# Patient Record
Sex: FEMALE | Race: WHITE | NOT HISPANIC OR LATINO | Employment: FULL TIME | URBAN - METROPOLITAN AREA
[De-identification: names, ages, dates, MRNs, and addresses within clinical notes are randomized per-mention and may not be internally consistent; named-entity substitution may affect disease eponyms.]

---

## 2017-01-03 ENCOUNTER — TELEPHONE (OUTPATIENT)
Dept: OBSTETRICS AND GYNECOLOGY | Facility: CLINIC | Age: 57
End: 2017-01-03

## 2017-01-03 NOTE — TELEPHONE ENCOUNTER
----- Message from Jr Peace MD sent at 1/2/2017  9:12 AM EST -----  Contact: [phone]  6 months sounds like a good start.    Jr Peace MD     ----- Message -----     From: Cecile Frazier MA     Sent: 12/29/2016   3:59 PM       To: MD Dr. Kobi Dennis,     When would you like patient to follow up with you?  She had her pap done 6/2016.  Biopsy done in July, 2016.     Thanks,  Cecile BLANDON  ----- Message -----     From: Yashira Perez     Sent: 12/29/2016   2:25 PM       To: Ceicle Frazier MA    This pt has had uterine cancer..  She wants to know if she needs to follow up in 6 month or 1 year?  She has been told by her Oncologist to check with her GYN to see how often she needs to follow up.    Please call her and let her know.  She also wants to know when was the last time she was here for a pap.    Thanks!    Yashira

## 2017-01-24 ENCOUNTER — OFFICE VISIT (OUTPATIENT)
Dept: OBSTETRICS AND GYNECOLOGY | Facility: CLINIC | Age: 57
End: 2017-01-24

## 2017-01-24 VITALS
DIASTOLIC BLOOD PRESSURE: 78 MMHG | BODY MASS INDEX: 50.02 KG/M2 | SYSTOLIC BLOOD PRESSURE: 130 MMHG | HEIGHT: 64 IN | WEIGHT: 293 LBS

## 2017-01-24 DIAGNOSIS — N76.2 ACUTE VULVITIS: Primary | ICD-10-CM

## 2017-01-24 DIAGNOSIS — Z13.9 SCREENING: ICD-10-CM

## 2017-01-24 LAB
BILIRUB BLD-MCNC: NEGATIVE MG/DL
CLARITY, POC: CLEAR
COLOR UR: YELLOW
GLUCOSE UR STRIP-MCNC: NEGATIVE MG/DL
KETONES UR QL: NEGATIVE
LEUKOCYTE EST, POC: ABNORMAL
NITRITE UR-MCNC: NEGATIVE MG/ML
PH UR: 6.5 [PH] (ref 5–8)
PROT UR STRIP-MCNC: NEGATIVE MG/DL
RBC # UR STRIP: NEGATIVE /UL
SP GR UR: 1.01 (ref 1–1.03)
UROBILINOGEN UR QL: NORMAL

## 2017-01-24 PROCEDURE — 81002 URINALYSIS NONAUTO W/O SCOPE: CPT | Performed by: OBSTETRICS & GYNECOLOGY

## 2017-01-24 PROCEDURE — 99213 OFFICE O/P EST LOW 20 MIN: CPT | Performed by: OBSTETRICS & GYNECOLOGY

## 2017-01-24 RX ORDER — LEVOTHYROXINE SODIUM 0.2 MG/1
200 TABLET ORAL DAILY
COMMUNITY
End: 2017-08-14 | Stop reason: SDUPTHER

## 2017-01-24 RX ORDER — NABUMETONE 500 MG/1
TABLET, FILM COATED ORAL
COMMUNITY
Start: 2016-09-08 | End: 2017-01-24

## 2017-01-24 RX ORDER — ERGOCALCIFEROL 1.25 MG/1
CAPSULE ORAL
COMMUNITY
Start: 2016-07-11 | End: 2017-08-14 | Stop reason: SDUPTHER

## 2017-01-24 RX ORDER — ATENOLOL 100 MG/1
TABLET ORAL
COMMUNITY
Start: 2016-08-01 | End: 2017-01-24 | Stop reason: SDUPTHER

## 2017-01-24 RX ORDER — LEVOTHYROXINE SODIUM 0.2 MG/1
200 TABLET ORAL
COMMUNITY
Start: 2016-01-25 | End: 2017-01-24

## 2017-01-24 RX ORDER — BUPROPION HYDROCHLORIDE 75 MG/1
75 TABLET ORAL
COMMUNITY
Start: 2016-11-03

## 2017-01-24 RX ORDER — LOSARTAN POTASSIUM AND HYDROCHLOROTHIAZIDE 12.5; 1 MG/1; MG/1
TABLET ORAL
COMMUNITY
Start: 2016-08-01 | End: 2017-01-24

## 2017-01-24 RX ORDER — LEVOTHYROXINE SODIUM 0.03 MG/1
25 TABLET ORAL
COMMUNITY
Start: 2016-02-15 | End: 2017-01-24 | Stop reason: SDUPTHER

## 2017-01-24 NOTE — MR AVS SNAPSHOT
Faith Magaña   1/24/2017 11:15 AM   Office Visit    Dept Phone:  [phone]   Encounter #:  [id number]    Provider:  Jr Peace MD   Department:  [facility]                Your Full Care Plan              Today's Medication Changes          These changes are accurate as of: 1/24/17 12:03 PM.  If you have any questions, ask your nurse or doctor.               New Medication(s)Ordered:     nystatin-triamcinolone 472767-2.1 UNIT/GM-% cream   Commonly known as:  MYCOLOG II   Apply  topically 2 (Two) Times a Day for 5 days.   Started by:  Jr Peace MD         Medication(s)that have changed:     aspirin 81 MG tablet   Take 81 mg by mouth.   What changed:  Another medication with the same name was removed. Continue taking this medication, and follow the directions you see here.   Changed by:  Jr Paece MD       atenolol 100 MG tablet   Commonly known as:  TENORMIN   Take 100 mg by mouth.   What changed:  Another medication with the same name was removed. Continue taking this medication, and follow the directions you see here.   Changed by:  Jr Peace MD       * LEVOTHROID 25 MCG tablet   Generic drug:  levothyroxine   Take 25 mcg by mouth.   What changed:  Another medication with the same name was removed. Continue taking this medication, and follow the directions you see here.   Changed by:  Jr Peace MD       * levothyroxine 200 MCG tablet   Commonly known as:  SYNTHROID, LEVOTHROID   Take 200 mcg by mouth Daily.   What changed:  Another medication with the same name was removed. Continue taking this medication, and follow the directions you see here.   Changed by:  Jr Peace MD       losartan-hydrochlorothiazide 100-12.5 MG per tablet   Commonly known as:  HYZAAR   Take 1 tablet by mouth.   What changed:  Another medication with the same name was removed. Continue taking this medication, and follow the directions you see here.      Changed by:  Jr Peace MD       nabumetone 500 MG tablet   Commonly known as:  RELAFEN   Take 500 mg by mouth.   What changed:  Another medication with the same name was removed. Continue taking this medication, and follow the directions you see here.   Changed by:  Jr Peace MD       TRINTELLIX 20 MG tablet   Generic drug:  Vortioxetine HBr   TAKE ONE TABLET BY MOUTH DAILY   What changed:  Another medication with the same name was removed. Continue taking this medication, and follow the directions you see here.   Changed by:  Jr Peace MD       * Notice:  This list has 2 medication(s) that are the same as other medications prescribed for you. Read the directions carefully, and ask your doctor or other care provider to review them with you.      Stop taking medication(s)listed here:     GAVILYTE-G 236 G solution   Generic drug:  polyethylene glycol   Stopped by:  Jr Peace MD                Where to Get Your Medications      These medications were sent to [address] [phone] [place] [id number] [address] [address]     Phone:  [phone]     nystatin-triamcinolone 399857-6.1 UNIT/GM-% cream                  Your Updated Medication List          This list is accurate as of: 1/24/17 12:03 PM.  Always use your most recent med list.                aspirin 81 MG tablet       atenolol 100 MG tablet   Commonly known as:  TENORMIN       buPROPion 75 MG tablet   Commonly known as:  WELLBUTRIN       * LEVOTHROID 25 MCG tablet   Generic drug:  levothyroxine       * levothyroxine 200 MCG tablet   Commonly known as:  SYNTHROID, LEVOTHROID       losartan-hydrochlorothiazide 100-12.5 MG per tablet   Commonly known as:  HYZAAR       nabumetone 500 MG tablet   Commonly known as:  RELAFEN       nystatin-triamcinolone 354268-2.1 UNIT/GM-% cream   Commonly known as:  MYCOLOG II   Apply  topically 2 (Two) Times a Day for 5 days.        Omega-3 Fish Oil 1200 MG capsule       ONE-A-DAY WOMENS FORMULA PO       TRINTELLIX 20 MG tablet   Generic drug:  Vortioxetine HBr       * vitamin D 86666 UNITS capsule capsule   Commonly known as:  ERGOCALCIFEROL       * vitamin D 71262 UNITS capsule capsule   Commonly known as:  ERGOCALCIFEROL       * Notice:  This list has 4 medication(s) that are the same as other medications prescribed for you. Read the directions carefully, and ask your doctor or other care provider to review them with you.            We Performed the Following     POC Urinalysis Dipstick       You Were Diagnosed With        Codes Comments    Acute vulvitis    -  Primary ICD-10-CM: N76.2  ICD-9-CM: 616.10     Screening     ICD-10-CM: Z13.9  ICD-9-CM: V82.9       Instructions     None    Patient Instructions History      Upcoming Appointments     Visit Type Date Time Department    GYN FOLLOW UP 2017 11:15 AM [facility]    GYN FOLLOW UP 2017  8:00 AM [facility]    GYN FOLLOW UP 7/3/2017  8:00 AM [facility]      [employer] Signup     [employer] allows you to send messages to your doctor, view your test results, renew your prescriptions, schedule appointments, and more. To sign up, go to [employer] and click on the Sign Up Now link in the New User? box. Enter your [employer] Activation Code exactly as it appears below along with the last four digits of your Social Security Number and your Date of Birth () to complete the sign-up process. If you do not sign up before the expiration date, you must request a new code.    [employer] Activation Code: [email or web address]  Expires: 2017 11:59 AM    If you have questions, you can email [email or web address] or call [phone] to talk to our [employer] staff. Remember, [employer] is NOT to be used for urgent needs. For medical emergencies, dial 911.               Other Info from Your Visit           Your Appointments     2017   "8:00 AM EST   GYN FOLLOW UP with Jr Peace MD   [facility] (--)    [address] [address] [phone]            Jul 03, 2017  8:00 AM EDT   GYN FOLLOW UP with Jr Peace MD   [facility] (--)    [address] [address] [phone]              Allergies     Sulfa Antibiotics  Shortness Of Breath, Rash      Reason for Visit     Follow-up VAGINAL RAWNESS      Vital Signs     Blood Pressure Height Weight Last Menstrual Period Body Mass Index Smoking Status    130/78 64\" (162.6 cm) 319 lb (145 kg) (LMP Unknown) 54.76 kg/m2 Never Smoker      Problems and Diagnoses Noted     High blood pressure    Endometrial cancer    Underactive thyroid    Sleep apnea    Inflamed vulva    -  Primary    Screening          Results     POC Urinalysis Dipstick      Component Value Standard Range & Units    Color Yellow Yellow, Straw, Dark Yellow, Ariana    Clarity, UA Clear Clear    Glucose, UA Negative Negative, 1000 mg/dL (3+) mg/dL    Bilirubin Negative Negative    Ketones, UA Negative Negative    Specific Gravity  1.015 1.005 - 1.030    Blood, UA Negative Negative    pH, Urine 6.5 5.0 - 8.0    Protein, POC Negative Negative mg/dL    Urobilinogen, UA Normal Normal    Leukocytes Trace Negative    Nitrite, UA Negative Negative                    "

## 2017-01-24 NOTE — PROGRESS NOTES
[facility]     2017      Patient:  Faith Magaña   MR#:[id number]    Office note    CC: vaginal irritation    Subjective     History of Present Illness  56 y.o. female  focal spot in the vaginal area with extreme irritation.  The symptoms started 2 weeks ago then in the last week seemed to get a lot better.  The area is markedly tender with burning exacerbated by urination    Other remarkable history is recent diagnosis of endometrial cancer status post hysterectomy last August.      Patient Active Problem List   Diagnosis   • Well female exam with routine gynecological exam   • Morbid obesity with BMI of 50.0-59.9, adult   • Postmenopausal bleeding   • Prothrombin [id number] mutation   • Endometrial cancer   • Benign hypertension   • Hypothyroidism   • Sleep apnea       Past Medical History   Diagnosis Date   • Anxiety    • Arthritis    • Asthma    • Depression    • Disease of thyroid gland      HYPO   • Endometriosis    • H/O prothrombin mutation 2015   • Hypertension    • LGSIL (low grade squamous intraepithelial dysplasia) 2004   • Lupus 2015   • Menopause    • Morbid obesity    • Pelvic pressure in female    • Prothrombin [id number] mutation 2015     heterozygous   • Sleep apnea    • Trichomonas infection 2014       Past Surgical History   Procedure Laterality Date   • Tubal abdominal ligation  1988     Sangeeta Tony       The following portions of the patient's history were reviewed and updated as appropriate: allergies, current medications, past family history, past medical history, past social history, past surgical history and problem list.    Review of Systems    BP Readings from Last 3 Encounters:   17 130/78   16 128/78   16 116/80      Wt Readings from Last 3 Encounters:   17 (!) 319 lb (145 kg)   16 (!) 308 lb (140 kg)   16 (!) 304 lb (138 kg)      BMI: Estimated body mass index is 54.76 kg/(m^2) as calculated from the  "following:    Height as of this encounter: 64\" (162.6 cm).    Weight as of this encounter: 319 lb (145 kg).  BSA: Estimated body surface area is 2.39 meters squared as calculated from the following:    Height as of this encounter: 64\" (162.6 cm).    Weight as of this encounter: 319 lb (145 kg).    Objective   Physical Exam   Constitutional: She is oriented to person, place, and time. She appears well-developed and well-nourished.   Cardiovascular: Normal rate and regular rhythm.    Pulmonary/Chest: Effort normal and breath sounds normal.   Abdominal: Soft. Bowel sounds are normal. She exhibits no distension and no mass. There is no tenderness.   Genitourinary: Vagina normal and uterus normal.   Genitourinary Comments: Some mild vulvar irritation and focal area of yeast on posterior vaginal fourchette   Neurological: She is alert and oriented to person, place, and time.   Psychiatric: She has a normal mood and affect. Her behavior is normal. Judgment and thought content normal.   Nursing note and vitals reviewed.        Assessment/Plan     1.  Yeast vulvitis -mytrex   2.  Endometrial cancer- records and staging are requested  3.  Morbid obesity      Jr Peace MD   1/24/2017 11:42 AM  "

## 2017-02-03 ENCOUNTER — TELEPHONE (OUTPATIENT)
Dept: OBSTETRICS AND GYNECOLOGY | Facility: CLINIC | Age: 57
End: 2017-02-03

## 2017-02-03 NOTE — TELEPHONE ENCOUNTER
----- Message from Helga Cohen sent at 2/3/2017  1:03 PM EST -----  Regarding: PROBLEM POST HYSTERECTOMY  Contact: [phone]  PT CALLED REPORTING THAT SHE HAS NOTICED BLOOD IN URINE AND PINKISH BLOOD WHEN WIPING.  STOMACH HAVING SLIGHT CRAMPS.  JUST WANTED TO BE REASSURED IF THIS IS SOMETHING SHE SHOULD BE CONCERNED ABOUT.

## 2017-02-03 NOTE — TELEPHONE ENCOUNTER
Spoke with Faith she will watch over weekend and if not resolved call Monday for apt per Dr Peace pt states she knows it not a uti on faint blood did not want to go to er

## 2017-02-03 NOTE — TELEPHONE ENCOUNTER
----- Message from Helga Cohen sent at 2/3/2017  1:03 PM EST -----  Regarding: PROBLEM POST HYSTERECTOMY  Contact: [phone]  PT CALLED REPORTING THAT SHE HAS NOTICED BLOOD IN URINE AND PINKISH BLOOD WHEN WIPING.  STOMACH HAVING SLIGHT CRAMPS.  JUST WANTED TO BE REASSURED IF THIS IS SOMETHING SHE SHOULD BE CONCERNED ABOUT.

## 2017-08-14 ENCOUNTER — OFFICE VISIT (OUTPATIENT)
Dept: OBSTETRICS AND GYNECOLOGY | Facility: CLINIC | Age: 57
End: 2017-08-14

## 2017-08-14 VITALS
DIASTOLIC BLOOD PRESSURE: 60 MMHG | BODY MASS INDEX: 48.82 KG/M2 | HEIGHT: 65 IN | SYSTOLIC BLOOD PRESSURE: 122 MMHG | WEIGHT: 293 LBS

## 2017-08-14 DIAGNOSIS — Z12.4 SCREENING FOR MALIGNANT NEOPLASM OF CERVIX: ICD-10-CM

## 2017-08-14 DIAGNOSIS — D68.52 HETEROZYGOUS FOR PROTHROMBIN G20210A MUTATION (HCC): ICD-10-CM

## 2017-08-14 DIAGNOSIS — C54.1 ENDOMETRIAL CANCER (HCC): ICD-10-CM

## 2017-08-14 DIAGNOSIS — Z13.9 SCREENING: ICD-10-CM

## 2017-08-14 DIAGNOSIS — E66.01 MORBID OBESITY WITH BMI OF 50.0-59.9, ADULT (HCC): ICD-10-CM

## 2017-08-14 DIAGNOSIS — G47.37 CENTRAL SLEEP APNEA DUE TO MEDICAL CONDITION: ICD-10-CM

## 2017-08-14 DIAGNOSIS — Z01.419 WELL FEMALE EXAM WITH ROUTINE GYNECOLOGICAL EXAM: Primary | ICD-10-CM

## 2017-08-14 DIAGNOSIS — I10 BENIGN HYPERTENSION: ICD-10-CM

## 2017-08-14 DIAGNOSIS — E03.9 ACQUIRED HYPOTHYROIDISM: ICD-10-CM

## 2017-08-14 LAB
BILIRUB BLD-MCNC: NEGATIVE MG/DL
CLARITY, POC: CLEAR
COLOR UR: YELLOW
GLUCOSE UR STRIP-MCNC: NEGATIVE MG/DL
KETONES UR QL: NEGATIVE
LEUKOCYTE EST, POC: NEGATIVE
NITRITE UR-MCNC: NEGATIVE MG/ML
PH UR: 5 [PH] (ref 5–8)
PROT UR STRIP-MCNC: NEGATIVE MG/DL
RBC # UR STRIP: NEGATIVE /UL
SP GR UR: 1.03 (ref 1–1.03)
UROBILINOGEN UR QL: NORMAL

## 2017-08-14 PROCEDURE — 99396 PREV VISIT EST AGE 40-64: CPT | Performed by: OBSTETRICS & GYNECOLOGY

## 2017-08-14 PROCEDURE — 81002 URINALYSIS NONAUTO W/O SCOPE: CPT | Performed by: OBSTETRICS & GYNECOLOGY

## 2017-08-14 RX ORDER — BUPROPION HYDROCHLORIDE 75 MG/1
TABLET ORAL
COMMUNITY
Start: 2017-04-24 | End: 2017-08-14 | Stop reason: SDUPTHER

## 2017-08-14 RX ORDER — NABUMETONE 500 MG/1
TABLET, FILM COATED ORAL
COMMUNITY
Start: 2016-09-08 | End: 2018-05-25

## 2017-08-14 RX ORDER — ERGOCALCIFEROL 1.25 MG/1
CAPSULE ORAL
COMMUNITY
Start: 2017-07-24 | End: 2017-08-14 | Stop reason: SDUPTHER

## 2017-08-14 RX ORDER — PSEUDOEPHEDRINE HCL 30 MG
100 TABLET ORAL
COMMUNITY
Start: 2016-08-20 | End: 2018-05-25

## 2017-08-14 RX ORDER — LEVOTHYROXINE SODIUM 0.2 MG/1
TABLET ORAL
COMMUNITY
Start: 2017-02-13 | End: 2017-08-14 | Stop reason: SDUPTHER

## 2017-08-14 RX ORDER — AMLODIPINE BESYLATE 5 MG/1
5 TABLET ORAL
COMMUNITY
Start: 2017-03-21 | End: 2018-03-21

## 2017-08-14 RX ORDER — FOLIC ACID/MULTIVIT,IRON,MINER 0.4MG-18MG
TABLET ORAL
COMMUNITY
End: 2017-08-14 | Stop reason: SDUPTHER

## 2017-08-14 RX ORDER — LOSARTAN POTASSIUM AND HYDROCHLOROTHIAZIDE 12.5; 1 MG/1; MG/1
TABLET ORAL
COMMUNITY
Start: 2017-06-25 | End: 2017-08-14 | Stop reason: SDUPTHER

## 2017-08-14 RX ORDER — ATENOLOL 100 MG/1
100 TABLET ORAL
COMMUNITY
Start: 2017-08-03

## 2017-08-14 RX ORDER — AMLODIPINE BESYLATE 5 MG/1
TABLET ORAL
COMMUNITY
Start: 2017-06-04 | End: 2017-08-14 | Stop reason: SDUPTHER

## 2017-08-14 RX ORDER — LOSARTAN POTASSIUM AND HYDROCHLOROTHIAZIDE 12.5; 1 MG/1; MG/1
TABLET ORAL
COMMUNITY
Start: 2017-07-01 | End: 2017-08-14 | Stop reason: SDUPTHER

## 2017-08-14 RX ORDER — ATENOLOL 50 MG/1
TABLET ORAL
COMMUNITY
Start: 2017-08-03 | End: 2017-08-14 | Stop reason: SDUPTHER

## 2017-08-14 RX ORDER — LEVOTHYROXINE SODIUM 0.03 MG/1
25 TABLET ORAL
COMMUNITY
Start: 2016-02-15 | End: 2017-08-14 | Stop reason: SDUPTHER

## 2017-08-14 NOTE — PROGRESS NOTES
[facility]  Routine Annual Visit    2017    Patient: Faith Magaña          MR#:[id number]      History of Present Illness    56 y.o. female  who presents for annual exam.    Patient presents feeling well without complaints.  Her history is remarkable for grade 1 stage I endometrial carcinoma.  The patient had a da Justin laparoscopic hysterectomy performed by Dr. Kennedy last year that occurred without complications.    No LMP recorded (lmp unknown). Patient is postmenopausal.  Obstetric History:  OB History      Para Term  AB TAB SAB Ectopic Multiple Living    4 3 3  1  1   3         Menstrual History:     No LMP recorded (lmp unknown). Patient is postmenopausal.       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   • Endometrial cancer   • Benign hypertension   • Hypothyroidism   • Sleep apnea       Past Medical History:   Diagnosis Date   • Anxiety    • Arthritis    • Asthma    • Depression    • Endometriosis    • Hypertension    • Hypothyroid     HYPO   • LGSIL (low grade squamous intraepithelial dysplasia) 2004   • Lupus 2015   • Menopause    • Morbid obesity    • Pelvic pressure in female    • Prothrombin [id number] mutation 2015    heterozygous   • Sleep apnea    • Trichomonas infection 2014       Past Surgical History:   Procedure Laterality Date   • HYSTERECTOMY  2016    ANAHY KENNEDY    • TUBAL ABDOMINAL LIGATION      Sangeeta Tony       History   Smoking Status   • Never Smoker   Smokeless Tobacco   • Never Used       Family History   Problem Relation Age of Onset   • Stroke Mother    • Heart disease Mother    • Hypertension Mother    • Diabetes Mother    • Heart disease Father    • Kidney disease Other    • Mental illness Other    •  Stroke Sister    • Mental illness Sister    • Ovarian cancer Sister 28   • Hypertension Maternal Aunt    • Diabetes Maternal Aunt    • Breast cancer Maternal Grandmother 62   • Bleeding Disorder Daughter        Prior to Admission medications    Medication Sig Start Date End Date Taking? Authorizing Provider   amLODIPine (NORVASC) 5 MG tablet Take 5 mg by mouth. 3/21/17 3/21/18 Yes Historical Provider, MD   aspirin 81 MG tablet Take 81 mg by mouth.   Yes Historical Provider, MD   atenolol (TENORMIN) 100 MG tablet Take 100 mg by mouth. 8/3/17  Yes Historical Provider, MD   buPROPion (WELLBUTRIN) 75 MG tablet Take 75 mg by mouth. 11/3/16  Yes Historical Provider, MD   docusate sodium 100 MG capsule Take 100 mg by mouth. 8/20/16  Yes Historical Provider, MD   losartan-hydrochlorothiazide (HYZAAR) 100-12.5 MG per tablet Take 1 tablet by mouth. 7/8/15  Yes Historical Provider, MD   Multiple Vitamins-Calcium (ONE-A-DAY WOMENS FORMULA PO) Take 1 tablet by mouth daily.   Yes Historical Provider, MD   nabumetone (RELAFEN) 500 MG tablet TAKE ONE TABLET BY MOUTH TWICE A DAY 9/8/16  Yes Historical Provider, MD   Omega-3 Fatty Acids (OMEGA-3 FISH OIL) 1200 MG capsule Take  by mouth.   Yes Historical Provider, MD   vitamin D (ERGOCALCIFEROL) 49628 UNITS capsule capsule TAKE ONE CAPSULE BY MOUTH ONCE WEEKLY 7/11/16  Yes Historical Provider, MD   buPROPion (WELLBUTRIN) 75 MG tablet TAKE ONE TABLET BY MOUTH TWICE A DAY 4/24/17 8/14/17 Yes Historical Provider, MD   levothyroxine (SYNTHROID, LEVOTHROID) 200 MCG tablet TAKE ONE TABLET BY MOUTH DAILY 2/13/17 8/14/17 Yes Historical Provider, MD   levothyroxine (SYNTHROID, LEVOTHROID) 25 MCG tablet Take 25 mcg by mouth. 2/15/16 8/14/17 Yes Historical Provider, MD   losartan-hydrochlorothiazide (HYZAAR) 100-12.5 MG per tablet TAKE ONE TABLET BY MOUTH DAILY 6/25/17 8/14/17 Yes Historical Provider, MD   losartan-hydrochlorothiazide (HYZAAR) 100-12.5 MG per tablet TAKE ONE TABLET BY MOUTH  "DAILY 7/1/17 8/14/17 Yes Historical Provider, MD   Vortioxetine HBr (TRINTELLIX) 20 MG tablet TAKE ONE TABLET BY MOUTH DAILY 8/1/17 8/14/17 Yes Historical Provider, MD   amLODIPine (NORVASC) 5 MG tablet  6/4/17 8/14/17  Historical Provider, MD   aspirin 81 MG tablet Take  by mouth.  8/14/17  Historical Provider, MD   atenolol (TENORMIN) 100 MG tablet Take 100 mg by mouth. 7/8/15 8/14/17  Historical Provider, MD   atenolol (TENORMIN) 50 MG tablet  8/3/17 8/14/17  Historical Provider, MD   levothyroxine (SYNTHROID, LEVOTHROID) 200 MCG tablet Take 200 mcg by mouth Daily.  8/14/17  Historical Provider, MD   nabumetone (RELAFEN) 500 MG tablet Take 500 mg by mouth. 7/8/15 8/14/17  Historical Provider, MD   Omega-3 Fatty Acids (OMEGA-3 FISH OIL) 1200 MG capsule Take  by mouth.  8/14/17  Historical Provider, MD   vitamin D (ERGOCALCIFEROL) 73299 UNITS capsule capsule TAKE ONE CAPSULE BY MOUTH ONCE WEEKLY 7/11/16 8/14/17  Historical Provider, MD   vitamin D (ERGOCALCIFEROL) 29297 UNITS capsule capsule TAKE ONE CAPSULE BY MOUTH ONCE WEEKLY 7/24/17 8/14/17  Historical Provider, MD   Vortioxetine HBr (TRINTELLIX) 20 MG tablet TAKE ONE TABLET BY MOUTH DAILY 11/7/16 8/14/17  Historical Provider, MD     ________________________________________    Current contraception: status post hysterectomy  History of abnormal Pap smear: no  Family history of uterine or ovarian cancer: no  Family History of colon cancer/colon polyps: no  History of abnormal mammogram: no  History of abnormal lipids: no    The following portions of the patient's history were reviewed and updated as appropriate: allergies, current medications, past family history, past medical history, past social history, past surgical history and problem list.    Review of Systems    Pertinent items are noted in HPI.     Objective   Physical Exam    /60  Ht 64.5\" (163.8 cm)  Wt (!) 318 lb 9.6 oz (145 kg)  LMP  (LMP Unknown)  BMI 53.84 kg/m2   BP Readings from Last 3 " "Encounters:   08/14/17 122/60   01/24/17 130/78   07/18/16 128/78      Wt Readings from Last 3 Encounters:   08/14/17 (!) 318 lb 9.6 oz (145 kg)   01/24/17 (!) 319 lb (145 kg)   07/18/16 (!) 308 lb (140 kg)        BMI: Estimated body mass index is 53.84 kg/(m^2) as calculated from the following:    Height as of this encounter: 64.5\" (163.8 cm).    Weight as of this encounter: 318 lb 9.6 oz (145 kg).    General:   alert, appears stated age and cooperative   Heart: regular rate and rhythm, S1, S2 normal, no murmur, click, rub or gallop   Lungs: clear to auscultation bilaterally   Abdomen: soft, non-tender, without masses or organomegaly and Morbidly obese   Breast: inspection negative, no nipple discharge or bleeding, no masses or nodularity palpable   Vulva: normal   Vagina: normal mucosa   Cervix: absent   Uterus: absent    Adnexa: exam limited by habitus     As part of wellness and prevention, the following topics were discussed with the patient:    []  Nutrition  [x]  Physical activity/regular exercise   []  Healthy weight  []  Injury prevention  []  Substance misuse/abuse  []  Sexual behavior  []  STD prevention  []  Contaception  []  Dental health  []  Mental health  []  Immunization  [x]  Encouraged SBE       Assessment:    Faith was seen today for gynecologic exam.    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      Jr Peace MD  8/14/2017 9:54 AM  "

## 2017-08-17 LAB
CYTOLOGIST CVX/VAG CYTO: NORMAL
CYTOLOGY CVX/VAG DOC THIN PREP: NORMAL
DX ICD CODE: NORMAL
HIV 1 & 2 AB SER-IMP: NORMAL
HPV I/H RISK 4 DNA CVX QL PROBE+SIG AMP: NEGATIVE
Lab: NORMAL
OTHER STN SPEC: NORMAL
PATH REPORT.FINAL DX SPEC: NORMAL
STAT OF ADQ CVX/VAG CYTO-IMP: NORMAL

## 2017-08-18 ENCOUNTER — TELEPHONE (OUTPATIENT)
Dept: OBSTETRICS AND GYNECOLOGY | Facility: CLINIC | Age: 57
End: 2017-08-18

## 2018-05-25 ENCOUNTER — HOSPITAL ENCOUNTER (EMERGENCY)
Facility: HOSPITAL | Age: 58
Discharge: HOME | End: 2018-05-25
Attending: EMERGENCY MEDICINE | Admitting: EMERGENCY MEDICINE

## 2018-05-25 ENCOUNTER — HOSPITAL ENCOUNTER (OUTPATIENT)
Dept: CARDIOLOGY | Facility: HOSPITAL | Age: 58
Discharge: HOME | End: 2018-05-25
Admitting: INTERNAL MEDICINE

## 2018-05-25 ENCOUNTER — APPOINTMENT (OUTPATIENT)
Dept: GENERAL RADIOLOGY | Facility: HOSPITAL | Age: 58
End: 2018-05-25

## 2018-05-25 ENCOUNTER — HOSPITAL ENCOUNTER (OUTPATIENT)
Dept: CARDIOLOGY | Facility: HOSPITAL | Age: 58
Discharge: HOME | End: 2018-05-25
Attending: INTERNAL MEDICINE

## 2018-05-25 VITALS
RESPIRATION RATE: 18 BRPM | TEMPERATURE: 97.5 F | DIASTOLIC BLOOD PRESSURE: 61 MMHG | HEART RATE: 50 BPM | BODY MASS INDEX: 50.02 KG/M2 | HEIGHT: 64 IN | OXYGEN SATURATION: 94 % | SYSTOLIC BLOOD PRESSURE: 123 MMHG | WEIGHT: 293 LBS

## 2018-05-25 VITALS
HEIGHT: 64 IN | WEIGHT: 293 LBS | OXYGEN SATURATION: 96 % | DIASTOLIC BLOOD PRESSURE: 68 MMHG | BODY MASS INDEX: 50.02 KG/M2 | SYSTOLIC BLOOD PRESSURE: 110 MMHG | RESPIRATION RATE: 16 BRPM

## 2018-05-25 DIAGNOSIS — R07.2 PRECORDIAL CHEST PAIN: Primary | ICD-10-CM

## 2018-05-25 DIAGNOSIS — R94.31 ABNORMAL EKG: ICD-10-CM

## 2018-05-25 DIAGNOSIS — R07.2 PRECORDIAL PAIN: Primary | ICD-10-CM

## 2018-05-25 DIAGNOSIS — R07.2 PRECORDIAL PAIN: ICD-10-CM

## 2018-05-25 LAB
ALBUMIN SERPL-MCNC: 4.1 G/DL (ref 3.5–5.2)
ALBUMIN/GLOB SERPL: 1.2 G/DL
ALP SERPL-CCNC: 69 U/L (ref 39–117)
ALT SERPL W P-5'-P-CCNC: 20 U/L (ref 1–33)
ANION GAP SERPL CALCULATED.3IONS-SCNC: 11.5 MMOL/L
AST SERPL-CCNC: 17 U/L (ref 1–32)
BASOPHILS # BLD AUTO: 0.02 10*3/MM3 (ref 0–0.2)
BASOPHILS NFR BLD AUTO: 0.3 % (ref 0–1.5)
BH CV NUCLEAR PRIOR STUDY: 2
BH CV STRESS BP STAGE 1: NORMAL
BH CV STRESS COMMENTS STAGE 1: NORMAL
BH CV STRESS DOSE REGADENOSON STAGE 1: 0.4
BH CV STRESS DURATION MIN STAGE 1: 0
BH CV STRESS DURATION SEC STAGE 1: 10
BH CV STRESS HR STAGE 1: 118
BH CV STRESS PROTOCOL 1: NORMAL
BH CV STRESS RECOVERY BP: NORMAL MMHG
BH CV STRESS RECOVERY HR: 77 BPM
BH CV STRESS STAGE 1: 1
BILIRUB SERPL-MCNC: 0.5 MG/DL (ref 0.1–1.2)
BUN BLD-MCNC: 16 MG/DL (ref 6–20)
BUN/CREAT SERPL: 17.4 (ref 7–25)
CALCIUM SPEC-SCNC: 9.6 MG/DL (ref 8.6–10.5)
CHLORIDE SERPL-SCNC: 101 MMOL/L (ref 98–107)
CO2 SERPL-SCNC: 26.5 MMOL/L (ref 22–29)
CREAT BLD-MCNC: 0.92 MG/DL (ref 0.57–1)
DEPRECATED RDW RBC AUTO: 44 FL (ref 37–54)
EOSINOPHIL # BLD AUTO: 0.2 10*3/MM3 (ref 0–0.7)
EOSINOPHIL NFR BLD AUTO: 3.4 % (ref 0.3–6.2)
ERYTHROCYTE [DISTWIDTH] IN BLOOD BY AUTOMATED COUNT: 14 % (ref 11.7–13)
GFR SERPL CREATININE-BSD FRML MDRD: 62 ML/MIN/1.73
GLOBULIN UR ELPH-MCNC: 3.3 GM/DL
GLUCOSE BLD-MCNC: 105 MG/DL (ref 65–99)
HCT VFR BLD AUTO: 38.6 % (ref 35.6–45.5)
HGB BLD-MCNC: 12.5 G/DL (ref 11.9–15.5)
IMM GRANULOCYTES # BLD: 0.02 10*3/MM3 (ref 0–0.03)
IMM GRANULOCYTES NFR BLD: 0.3 % (ref 0–0.5)
LV EF NUC BP: 58 %
LYMPHOCYTES # BLD AUTO: 1.9 10*3/MM3 (ref 0.9–4.8)
LYMPHOCYTES NFR BLD AUTO: 31.8 % (ref 19.6–45.3)
MAXIMAL PREDICTED HEART RATE: 161 BPM
MCH RBC QN AUTO: 28 PG (ref 26.9–32)
MCHC RBC AUTO-ENTMCNC: 32.4 G/DL (ref 32.4–36.3)
MCV RBC AUTO: 86.4 FL (ref 80.5–98.2)
MONOCYTES # BLD AUTO: 0.35 10*3/MM3 (ref 0.2–1.2)
MONOCYTES NFR BLD AUTO: 5.9 % (ref 5–12)
NEUTROPHILS # BLD AUTO: 3.5 10*3/MM3 (ref 1.9–8.1)
NEUTROPHILS NFR BLD AUTO: 58.6 % (ref 42.7–76)
PERCENT MAX PREDICTED HR: 73.29 %
PLATELET # BLD AUTO: 234 10*3/MM3 (ref 140–500)
PMV BLD AUTO: 11.2 FL (ref 6–12)
POTASSIUM BLD-SCNC: 3.8 MMOL/L (ref 3.5–5.2)
PROT SERPL-MCNC: 7.4 G/DL (ref 6–8.5)
RBC # BLD AUTO: 4.47 10*6/MM3 (ref 3.9–5.2)
SODIUM BLD-SCNC: 139 MMOL/L (ref 136–145)
STRESS BASELINE BP: NORMAL MMHG
STRESS BASELINE HR: 59 BPM
STRESS PERCENT HR: 86 %
STRESS POST PEAK BP: NORMAL MMHG
STRESS POST PEAK HR: 118 BPM
STRESS TARGET HR: 137 BPM
TROPONIN T SERPL-MCNC: <0.01 NG/ML (ref 0–0.03)
WBC NRBC COR # BLD: 5.97 10*3/MM3 (ref 4.5–10.7)

## 2018-05-25 PROCEDURE — 93016 CV STRESS TEST SUPVJ ONLY: CPT | Performed by: INTERNAL MEDICINE

## 2018-05-25 PROCEDURE — 78451 HT MUSCLE IMAGE SPECT SING: CPT

## 2018-05-25 PROCEDURE — 94760 N-INVAS EAR/PLS OXIMETRY 1: CPT

## 2018-05-25 PROCEDURE — 0 TECHNETIUM TETROFOSMIN KIT: Performed by: INTERNAL MEDICINE

## 2018-05-25 PROCEDURE — 99284 EMERGENCY DEPT VISIT MOD MDM: CPT

## 2018-05-25 PROCEDURE — 93017 CV STRESS TEST TRACING ONLY: CPT

## 2018-05-25 PROCEDURE — 93005 ELECTROCARDIOGRAM TRACING: CPT | Performed by: EMERGENCY MEDICINE

## 2018-05-25 PROCEDURE — 93010 ELECTROCARDIOGRAM REPORT: CPT | Performed by: INTERNAL MEDICINE

## 2018-05-25 PROCEDURE — 99204 OFFICE O/P NEW MOD 45 MIN: CPT | Performed by: INTERNAL MEDICINE

## 2018-05-25 PROCEDURE — 71045 X-RAY EXAM CHEST 1 VIEW: CPT

## 2018-05-25 PROCEDURE — A9502 TC99M TETROFOSMIN: HCPCS | Performed by: INTERNAL MEDICINE

## 2018-05-25 PROCEDURE — 78452 HT MUSCLE IMAGE SPECT MULT: CPT

## 2018-05-25 PROCEDURE — 93018 CV STRESS TEST I&R ONLY: CPT | Performed by: INTERNAL MEDICINE

## 2018-05-25 PROCEDURE — 78452 HT MUSCLE IMAGE SPECT MULT: CPT | Performed by: INTERNAL MEDICINE

## 2018-05-25 PROCEDURE — 25010000002 REGADENOSON 0.4 MG/5ML SOLUTION: Performed by: INTERNAL MEDICINE

## 2018-05-25 PROCEDURE — 84484 ASSAY OF TROPONIN QUANT: CPT | Performed by: NURSE PRACTITIONER

## 2018-05-25 PROCEDURE — 85025 COMPLETE CBC W/AUTO DIFF WBC: CPT | Performed by: NURSE PRACTITIONER

## 2018-05-25 PROCEDURE — 80053 COMPREHEN METABOLIC PANEL: CPT | Performed by: NURSE PRACTITIONER

## 2018-05-25 RX ORDER — LEVOTHYROXINE SODIUM 0.2 MG/1
TABLET ORAL
COMMUNITY
Start: 2017-11-20 | End: 2019-12-20

## 2018-05-25 RX ADMIN — TETROFOSMIN 1 DOSE: 1.38 INJECTION, POWDER, LYOPHILIZED, FOR SOLUTION INTRAVENOUS at 12:32

## 2018-05-25 RX ADMIN — REGADENOSON 0.4 MG: 0.08 INJECTION, SOLUTION INTRAVENOUS at 12:33

## 2018-05-25 RX ADMIN — TETROFOSMIN 1 DOSE: 1.38 INJECTION, POWDER, LYOPHILIZED, FOR SOLUTION INTRAVENOUS at 12:33

## 2018-05-25 NOTE — PROGRESS NOTES
Date of Office Visit: 2018  Encounter Provider: Anatoly Lester MD  Place of Service: [facility] CARDIOLOGY  Patient Name: Faith Magaña  :1960    Chief complaint: Chest pain    History of Present Illness:    This is a very pleasant 57 year-old white female with a past medical history significant   for obesity and hypertension who presents for evaluation.  The patient states that she   had tightness under her left breast which radiated into her left arm (and also felt numb).    She also had some vague pressure in the center of her chest.  She took 4 baby aspirin   at home, and then proceeded to the emergency department.  There, her EKG showed   T-wave changes in V1 and V2, and her troponin was negative.  She was then sent to   the [facility] in our office for further evaluation.  She is currently chest pain-free.  She has   not had any other exertional symptoms recently.  Her father had an MI at age 62, and   her mother had an MI at age 83.    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     • atenolol (TENORMIN) 100 MG tablet Take 100 mg by mouth.     • buPROPion (WELLBUTRIN) 75 MG tablet Take 75 mg by mouth.     • levothyroxine (SYNTHROID, LEVOTHROID) 200 MCG tablet TAKE ONE TABLET BY MOUTH DAILY     •  losartan-hydrochlorothiazide (HYZAAR) 100-12.5 MG per tablet Take 1 tablet by mouth.     • Multiple Vitamins-Calcium (ONE-A-DAY WOMENS FORMULA PO) Take 1 tablet by mouth daily.     • Omega-3 Fatty Acids (OMEGA-3 FISH OIL) 1200 MG capsule Take  by mouth.     • vitamin D (ERGOCALCIFEROL) 51162 UNITS capsule capsule TAKE ONE CAPSULE BY MOUTH ONCE WEEKLY     • Vortioxetine HBr 20 MG tablet Take 20 mg by mouth.     • [DISCONTINUED] docusate sodium 100 MG capsule Take 100 mg by mouth.     • [DISCONTINUED] nabumetone (RELAFEN) 500 MG tablet TAKE ONE TABLET BY MOUTH TWICE A DAY       No current facility-administered medications on file prior to encounter.      Allergies as of 05/25/2018 - Reviewed 05/25/2018   Allergen Reaction Noted   • Sulfa antibiotics Shortness Of Breath and Rash 06/27/2013     Social History     Social History   • Marital status:      Spouse name: N/A   • Number of children: 3   • Years of education: 12     Occupational History   • Not on file.     Social History Main Topics   • Smoking status: Never Smoker   • Smokeless tobacco: Never Used   • Alcohol use No   • Drug use: No   • Sexual activity: No     Other Topics Concern   • Not on file     Social History Narrative    Ob/gyn patient since 2004.     Family History   Problem Relation Age of Onset   • Stroke Mother    • Heart disease Mother    • Hypertension Mother    • Diabetes Mother    • Heart disease Father    • Kidney disease Other    • Mental illness Other    • Stroke Sister    • Mental illness Sister    • Ovarian cancer Sister 28   • Hypertension Maternal Aunt    • Diabetes Maternal Aunt    • Breast cancer Maternal Grandmother 62   • Bleeding Disorder Daughter        Review of Systems   Constitution: Positive for malaise/fatigue.   HENT: Positive for sore throat.    Cardiovascular: Positive for dyspnea on exertion.   Endocrine: Positive for heat intolerance.   Skin: Positive for itching and rash.   Musculoskeletal: Positive for joint  "pain, joint swelling and myalgias.   Neurological: Positive for excessive daytime sleepiness.   Psychiatric/Behavioral: Positive for depression. The patient is nervous/anxious.    All other systems reviewed and are negative.     Objective:     Vitals:    05/25/18 0924   BP: 110/68   BP Location: Left arm   Patient Position: Sitting   Resp: 16   SpO2: 96%   Weight: (!) 145 kg (320 lb)   Height: 162.6 cm (64\")     Body mass index is 54.93 kg/m².    Physical Exam   Constitutional: She is oriented to person, place, and time. She appears well-developed and well-nourished.   HENT:   Head: Normocephalic and atraumatic.   Eyes: Conjunctivae are normal.   Neck: Neck supple.   Cardiovascular: Normal rate and regular rhythm.  Exam reveals no gallop and no friction rub.    No murmur heard.  Pulmonary/Chest: Effort normal and breath sounds normal.   Abdominal: Soft. There is no tenderness.   Musculoskeletal: She exhibits no edema.   Neurological: She is alert and oriented to person, place, and time.   Skin: Skin is warm.   Psychiatric: She has a normal mood and affect. Her behavior is normal.     Lab Review:   Procedures      Assessment:       Diagnosis Plan   1. Precordial pain  Stress Test With Myocardial Perfusion One Day   2. Abnormal EKG  Stress Test With Myocardial Perfusion One Day     Plan:       Again, the patient has had a single episode of chest discomfort which occurred this morning.    She had tightness under her left breast with associated left arm numbness.  She also had   some vague pressure in the center of her chest.  Her EKG showed T-wave changes in V1   and V2, although her troponin remained negative.  She does have a family history of coronary   artery disease, and I have recommended proceeding with a Lexiscan Myoview stress test.    She does not feel that she will be able to exercise on a treadmill successfully.  Further plans   will be made pending the results of the stress test.      "

## 2018-06-18 ENCOUNTER — TELEPHONE (OUTPATIENT)
Dept: CARDIOLOGY | Facility: HOSPITAL | Age: 58
End: 2018-06-18

## 2018-12-04 ENCOUNTER — OFFICE VISIT (OUTPATIENT)
Dept: OBSTETRICS AND GYNECOLOGY | Age: 58
End: 2018-12-04

## 2018-12-04 VITALS
HEIGHT: 65 IN | WEIGHT: 293 LBS | DIASTOLIC BLOOD PRESSURE: 70 MMHG | BODY MASS INDEX: 48.82 KG/M2 | SYSTOLIC BLOOD PRESSURE: 132 MMHG

## 2018-12-04 DIAGNOSIS — E66.01 MORBID OBESITY WITH BMI OF 50.0-59.9, ADULT (HCC): ICD-10-CM

## 2018-12-04 DIAGNOSIS — I10 ESSENTIAL HYPERTENSION: ICD-10-CM

## 2018-12-04 DIAGNOSIS — Z13.89 SCREENING FOR BLOOD OR PROTEIN IN URINE: ICD-10-CM

## 2018-12-04 DIAGNOSIS — C54.1 ENDOMETRIAL CANCER (HCC): ICD-10-CM

## 2018-12-04 DIAGNOSIS — G47.37 CENTRAL SLEEP APNEA DUE TO MEDICAL CONDITION: ICD-10-CM

## 2018-12-04 DIAGNOSIS — D68.52 HETEROZYGOUS FOR PROTHROMBIN G20210A MUTATION (HCC): ICD-10-CM

## 2018-12-04 DIAGNOSIS — E03.9 ACQUIRED HYPOTHYROIDISM: ICD-10-CM

## 2018-12-04 DIAGNOSIS — Z01.419 WELL FEMALE EXAM WITH ROUTINE GYNECOLOGICAL EXAM: Primary | ICD-10-CM

## 2018-12-04 DIAGNOSIS — Z12.72 VAGINAL PAP SMEAR: ICD-10-CM

## 2018-12-04 LAB
BILIRUB BLD-MCNC: NEGATIVE MG/DL
CLARITY, POC: CLEAR
COLOR UR: YELLOW
GLUCOSE UR STRIP-MCNC: NEGATIVE MG/DL
KETONES UR QL: NEGATIVE
LEUKOCYTE EST, POC: NEGATIVE
NITRITE UR-MCNC: NEGATIVE MG/ML
PH UR: 5.5 [PH] (ref 5–8)
PROT UR STRIP-MCNC: NEGATIVE MG/DL
RBC # UR STRIP: NEGATIVE /UL
SP GR UR: 1.02 (ref 1–1.03)
UROBILINOGEN UR QL: NORMAL

## 2018-12-04 PROCEDURE — 99396 PREV VISIT EST AGE 40-64: CPT | Performed by: OBSTETRICS & GYNECOLOGY

## 2018-12-04 PROCEDURE — 81002 URINALYSIS NONAUTO W/O SCOPE: CPT | Performed by: OBSTETRICS & GYNECOLOGY

## 2018-12-04 RX ORDER — CYCLOSPORINE 0.5 MG/ML
1 EMULSION OPHTHALMIC AS NEEDED
COMMUNITY
Start: 2018-08-30 | End: 2019-12-20

## 2018-12-04 RX ORDER — CLOTRIMAZOLE AND BETAMETHASONE DIPROPIONATE 10; .64 MG/G; MG/G
CREAM TOPICAL 2 TIMES DAILY
Qty: 45 G | Refills: 1 | Status: SHIPPED | OUTPATIENT
Start: 2018-12-04 | End: 2019-12-20

## 2018-12-04 NOTE — PROGRESS NOTES
Routine Annual Visit    2018    Patient: Faith Magaña          MR#:[id number]      History of Present Illness    Chief Complaint   Patient presents with   • Gynecologic Exam     Annual.  Last pap 17, negative. Last mammo, will call for report. Cologard 3/2018, negative.        58 y.o. female  who presents for annual exam.    Patient presents for routine annual exam feeling well without any specific complaints.  The patient does report episodic issues of focal itching on her vulva        No LMP recorded (lmp unknown). Patient is postmenopausal.  Obstetric History:  OB History      Para Term  AB Living    4 3 3   1 3    SAB TAB Ectopic Molar Multiple Live Births    1         3         Menstrual History:     No LMP recorded (lmp unknown). Patient is postmenopausal.       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 (CMS/[facility])   • Endometrial cancer (CMS/HCC)   • Essential hypertension   • Acquired hypothyroidism   • Sleep apnea       Past Medical History:   Diagnosis Date   • Anxiety    • Arthritis    • Asthma    • Depression    • Endometriosis    • Hypertension    • Hypothyroid     HYPO   • LGSIL (low grade squamous intraepithelial dysplasia) 2004   • Lupus 2015   • Menopause    • Morbid obesity (CMS/HCC)    • Pelvic pressure in female    • Prothrombin [id number] mutation (CMS/HCC) 2015    heterozygous   • Sleep apnea    • Trichomonas infection 2014       Past Surgical History:   Procedure Laterality Date   • HYSTERECTOMY  2016    ANAHY LUJAN    • TUBAL ABDOMINAL LIGATION      Sangeeta Tony       Social History     Tobacco Use   Smoking Status Never Smoker   Smokeless Tobacco Never Used       Family History   Problem Relation Age of Onset   •  Stroke Mother    • Heart disease Mother    • Hypertension Mother    • Diabetes Mother    • Heart disease Father    • Kidney disease Other    • Mental illness Other    • Stroke Sister    • Mental illness Sister    • Ovarian cancer Sister 28   • Hypertension Maternal Aunt    • Diabetes Maternal Aunt    • Breast cancer Maternal Grandmother 62   • Bleeding Disorder Daughter        Prior to Admission medications    Medication Sig Start Date End Date Taking? Authorizing Provider   aspirin 81 MG tablet Take 81 mg by mouth.   Yes Miranda Hernandez MD   atenolol (TENORMIN) 100 MG tablet Take 100 mg by mouth. 8/3/17  Yes Miranda Hernandez MD   buPROPion (WELLBUTRIN) 75 MG tablet Take 75 mg by mouth. 11/3/16  Yes Miranda Hernandez MD   levothyroxine (SYNTHROID, LEVOTHROID) 200 MCG tablet TAKE ONE TABLET BY MOUTH DAILY 11/20/17  Yes Miranda Hernandez MD   losartan-hydrochlorothiazide (HYZAAR) 100-12.5 MG per tablet Take 1 tablet by mouth. 7/8/15  Yes Miranda Hernandez MD   Multiple Vitamins-Calcium (ONE-A-DAY WOMENS FORMULA PO) Take 1 tablet by mouth daily.   Yes Miranda Hernandez MD   Omega-3 Fatty Acids (OMEGA-3 FISH OIL) 1200 MG capsule Take  by mouth.   Yes Miranda Hernandez MD   RESTASIS 0.05 % ophthalmic emulsion Administer 1 drop to both eyes As Needed. 8/30/18  Yes Miranda Hernandez MD   vitamin D (ERGOCALCIFEROL) 08650 UNITS capsule capsule TAKE ONE CAPSULE BY MOUTH ONCE WEEKLY 7/11/16  Yes Miranda Hernandez MD   Vortioxetine HBr 20 MG tablet Take 20 mg by mouth. 2/8/18  Yes Miranda Hernandez MD     ________________________________________    Current contraception: post menopausal status  History of abnormal Pap smear: no  Family history of uterine or ovarian cancer: no  Family History of colon cancer/colon polyps: no  History of abnormal mammogram: no  History of abnormal lipids: no    The following portions of the patient's history were reviewed and updated as appropriate:  "allergies, current medications, past family history, past medical history, past social history, past surgical history and problem list.    Review of Systems    Pertinent items are noted in HPI.     Objective   Physical Exam    /70   Ht 163.8 cm (64.5\")   Wt (!) 142 kg (314 lb)   LMP  (LMP Unknown)   Breastfeeding? No   BMI 53.07 kg/m²    BP Readings from Last 3 Encounters:   12/04/18 132/70   05/25/18 110/68   05/25/18 123/61      Wt Readings from Last 3 Encounters:   12/04/18 (!) 142 kg (314 lb)   05/25/18 (!) 145 kg (320 lb)   05/25/18 (!) 145 kg (320 lb)        BMI: Estimated body mass index is 53.07 kg/m² as calculated from the following:    Height as of this encounter: 163.8 cm (64.5\").    Weight as of this encounter: 142 kg (314 lb).       General: alert, appears stated age, cooperative and morbidly obese   Heart: regular rate and rhythm, S1, S2 normal, no murmur, click, rub or gallop and muffled heart sounds   Lungs: clear to auscultation bilaterally   Abdomen: soft, non-tender, without masses or organomegaly and protuberant   Breast: inspection negative, no nipple discharge or bleeding, no masses or nodularity palpable   Vulva: normal, bartholin's, Urethra, [place]'s normal and moderate atrophy   Vagina: normal mucosa, normal discharge, atrophic appearance    Cervix: absent   Uterus: absent    Adnexa: exam limited by habitus     As part of wellness and prevention, the following topics were discussed with the patient:     []  Nutrition  [x]  Physical activity/regular exercise   []  Healthy weight  []  Injury prevention  []  Smoking cessation  []  Substance misuse/abuse  []  Sexual behavior  []  STD prevention  []  Contaception  []  Dental health  []  Mental health  []  Immunization  [x]  Encouraged SBE        Assessment:    Faith was seen today for gynecologic exam.    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      Jr Peace MD  12/4/2018 9:29 AM

## 2019-08-15 ENCOUNTER — TELEPHONE (OUTPATIENT)
Dept: OBSTETRICS AND GYNECOLOGY | Age: 59
End: 2019-08-15

## 2019-08-15 DIAGNOSIS — Z12.31 SCREENING MAMMOGRAM, ENCOUNTER FOR: ICD-10-CM

## 2019-08-15 DIAGNOSIS — Z12.31 SCREENING MAMMOGRAM, ENCOUNTER FOR: Primary | ICD-10-CM

## 2019-08-15 NOTE — TELEPHONE ENCOUNTER
[facility] calling to obtain a routine screening mammogram order faxed to [phone]. Order is not in Epic. She has an appt.tomorrow.

## 2019-12-20 ENCOUNTER — OFFICE VISIT (OUTPATIENT)
Dept: OBSTETRICS AND GYNECOLOGY | Age: 59
End: 2019-12-20

## 2019-12-20 VITALS
SYSTOLIC BLOOD PRESSURE: 124 MMHG | DIASTOLIC BLOOD PRESSURE: 70 MMHG | BODY MASS INDEX: 45.93 KG/M2 | HEIGHT: 64 IN | WEIGHT: 269 LBS

## 2019-12-20 DIAGNOSIS — Z01.419 WELL FEMALE EXAM WITH ROUTINE GYNECOLOGICAL EXAM: Primary | ICD-10-CM

## 2019-12-20 DIAGNOSIS — G47.37 CENTRAL SLEEP APNEA DUE TO MEDICAL CONDITION: ICD-10-CM

## 2019-12-20 DIAGNOSIS — Z12.72 VAGINAL PAP SMEAR: ICD-10-CM

## 2019-12-20 DIAGNOSIS — Z13.89 SCREENING FOR BLOOD OR PROTEIN IN URINE: ICD-10-CM

## 2019-12-20 DIAGNOSIS — E66.01 MORBID OBESITY WITH BMI OF 50.0-59.9, ADULT (HCC): ICD-10-CM

## 2019-12-20 DIAGNOSIS — I10 ESSENTIAL HYPERTENSION: ICD-10-CM

## 2019-12-20 DIAGNOSIS — C54.1 ENDOMETRIAL CANCER (HCC): ICD-10-CM

## 2019-12-20 DIAGNOSIS — E03.9 ACQUIRED HYPOTHYROIDISM: ICD-10-CM

## 2019-12-20 DIAGNOSIS — D68.52 HETEROZYGOUS FOR PROTHROMBIN G20210A MUTATION (HCC): ICD-10-CM

## 2019-12-20 LAB
BILIRUB BLD-MCNC: NEGATIVE MG/DL
CLARITY, POC: CLEAR
COLOR UR: YELLOW
GLUCOSE UR STRIP-MCNC: NEGATIVE MG/DL
KETONES UR QL: NEGATIVE
LEUKOCYTE EST, POC: NEGATIVE
NITRITE UR-MCNC: NEGATIVE MG/ML
PH UR: 6.5 [PH] (ref 5–8)
PROT UR STRIP-MCNC: NEGATIVE MG/DL
RBC # UR STRIP: NEGATIVE /UL
SP GR UR: 1.02 (ref 1–1.03)
UROBILINOGEN UR QL: NORMAL

## 2019-12-20 PROCEDURE — 99396 PREV VISIT EST AGE 40-64: CPT | Performed by: OBSTETRICS & GYNECOLOGY

## 2019-12-20 PROCEDURE — 81002 URINALYSIS NONAUTO W/O SCOPE: CPT | Performed by: OBSTETRICS & GYNECOLOGY

## 2019-12-20 RX ORDER — LEVOTHYROXINE SODIUM 175 UG/1
175 TABLET ORAL DAILY
COMMUNITY
Start: 2019-12-06

## 2019-12-20 RX ORDER — MELOXICAM 15 MG/1
15 TABLET ORAL
COMMUNITY
Start: 2019-09-25 | End: 2020-09-24

## 2019-12-20 NOTE — PROGRESS NOTES
Routine Annual Visit    2019    Patient: Faith Magaña          MR#:[id number]      History of Present Illness    Chief Complaint   Patient presents with   • Gynecologic Exam     Annual.  Last pap 18, negative. Last mammo 19, negative. Cologard 3/2018, negative. (Hysterectomy 16)       59 y.o. female  who presents for annual exam.    The patient presents for routine annual exam feeling well without any complaints.  She has a history of stage I endometrial cancer and remains disease-free.    The patient reports 60 pound weight loss in the last year that was planned.    No LMP recorded (lmp unknown). Patient is postmenopausal.  Obstetric History:  OB History        4    Para   3    Term   3            AB   1    Living   3       SAB   1    TAB        Ectopic        Molar        Multiple        Live Births   3               Menstrual History:     No LMP recorded (lmp unknown). Patient is postmenopausal.       Sexual History:       ________________________________________  Patient Active Problem List   Diagnosis   • Well female exam with routine gynecological exam   • Morbid obesity with BMI of 50.0-59.9, adult (CMS/HCC)   • Heterozygous for prothrombin [id number] mutation (CMS/HCC)   • Endometrial cancer (CMS/HCC)   • Essential hypertension   • Acquired hypothyroidism   • Sleep apnea       Past Medical History:   Diagnosis Date   • Anxiety    • Arthritis    • Asthma    • Depression    • LGSIL (low grade squamous intraepithelial dysplasia) 2004   • Lupus (CMS/HCC) 2015   • Menopause    • Morbid obesity (CMS/HCC)    • Prothrombin [id number] mutation (CMS/HCC) 2015    heterozygous   • Sleep apnea    • Trichomonas infection 2014       Past Surgical History:   Procedure Laterality Date   • HYSTERECTOMY  2016    ANAHY LUJAN    • TUBAL ABDOMINAL LIGATION      Sangeeta Tony       Social History     Tobacco Use   Smoking Status Never Smoker   Smokeless Tobacco  Never Used       Family History   Problem Relation Age of Onset   • Stroke Mother    • Heart disease Mother    • Hypertension Mother    • Diabetes Mother    • Heart disease Father    • Kidney disease Other    • Mental illness Other    • Stroke Sister    • Mental illness Sister    • Ovarian cancer Sister 28   • Hypertension Maternal Aunt    • Diabetes Maternal Aunt    • Breast cancer Maternal Grandmother 62   • Bleeding Disorder Daughter        Prior to Admission medications    Medication Sig Start Date End Date Taking? Authorizing Provider   aspirin 81 MG tablet Take 81 mg by mouth.   Yes Miranda Hernandez MD   atenolol (TENORMIN) 100 MG tablet Take 100 mg by mouth. 8/3/17  Yes Miranda Hernandez MD   buPROPion (WELLBUTRIN) 75 MG tablet Take 75 mg by mouth. 11/3/16  Yes Miranda Hernandez MD   levothyroxine (SYNTHROID, LEVOTHROID) 175 MCG tablet Take 175 mcg by mouth Daily. 12/6/19  Yes Miranda Hernandez MD   losartan-hydrochlorothiazide (HYZAAR) 100-12.5 MG per tablet Take 1 tablet by mouth. 7/8/15  Yes Miranda Hernandez MD   meloxicam (MOBIC) 15 MG tablet Take 15 mg by mouth. 9/25/19 9/24/20 Yes Miranda Hernandez MD   Multiple Vitamins-Calcium (ONE-A-DAY WOMENS FORMULA PO) Take 1 tablet by mouth daily.   Yes Miranda Hernandez MD   vitamin D (ERGOCALCIFEROL) 79744 UNITS capsule capsule TAKE ONE CAPSULE BY MOUTH ONCE WEEKLY 7/11/16  Yes Miranda Hernandez MD   Vortioxetine HBr 20 MG tablet Take 20 mg by mouth. 2/8/18  Yes Miranda Hernandez MD   clotrimazole-betamethasone (LOTRISONE) 1-0.05 % cream Apply  topically to the appropriate area as directed 2 (Two) Times a Day. 12/4/18 12/20/19  Jr Peace MD   levothyroxine (SYNTHROID, LEVOTHROID) 200 MCG tablet TAKE ONE TABLET BY MOUTH DAILY 11/20/17 12/20/19  Miranda Hernandez MD   Omega-3 Fatty Acids (OMEGA-3 FISH OIL) 1200 MG capsule Take  by mouth.  12/20/19  Miranda Hernandez MD   RESTASIS 0.05 % ophthalmic emulsion  "Administer 1 drop to both eyes As Needed. 8/30/18 12/20/19  Provider, MD Miranda     ________________________________________    Current contraception: status post hysterectomy  History of abnormal Pap smear: no  Family history of uterine or ovarian cancer: no  Family History of colon cancer/colon polyps: no  History of abnormal mammogram: no  History of abnormal lipids: no    The following portions of the patient's history were reviewed and updated as appropriate: allergies, current medications, past family history, past medical history, past social history, past surgical history and problem list.    Review of Systems    Pertinent items are noted in HPI.     Objective   Physical Exam    /70   Ht 162.6 cm (64\")   Wt 122 kg (269 lb)   LMP  (LMP Unknown)   Breastfeeding No   BMI 46.17 kg/m²    BP Readings from Last 3 Encounters:   12/20/19 124/70   12/04/18 132/70   05/25/18 110/68      Wt Readings from Last 3 Encounters:   12/20/19 122 kg (269 lb)   12/04/18 (!) 142 kg (314 lb)   05/25/18 (!) 145 kg (320 lb)        BMI: Estimated body mass index is 46.17 kg/m² as calculated from the following:    Height as of this encounter: 162.6 cm (64\").    Weight as of this encounter: 122 kg (269 lb).       General: alert, appears stated age and cooperative   Heart: regular rate and rhythm, S1, S2 normal, no murmur, click, rub or gallop   Lungs: clear to auscultation bilaterally   Abdomen: soft, non-tender, without masses, no organomegaly   Breast: inspection negative, no nipple discharge or bleeding, no masses or nodularity palpable   Vulva: normal and bartholin's, Urethra, [place]'s normal   Vagina: normal mucosa, normal discharge   Cervix: absent   Uterus: absent    Adnexa: exam limited by habitus     As part of wellness and prevention, the following topics were discussed with the patient:     []  Nutrition  []  Physical activity/regular exercise   []  Healthy weight  []  Injury prevention  []  Smoking " cessation  []  Substance misuse/abuse  []  Sexual behavior  []  STD prevention  []  Contaception  []  Dental health  []  Mental health  []  Immunization  []  Encouraged SBE        Assessment:    Faith was seen today for gynecologic exam.    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      Jr Peace MD  12/20/2019 1:03 PM

## 2019-12-24 ENCOUNTER — TELEPHONE (OUTPATIENT)
Dept: OBSTETRICS AND GYNECOLOGY | Age: 59
End: 2019-12-24

## 2019-12-24 LAB
CYTOLOGIST CVX/VAG CYTO: NORMAL
CYTOLOGY CVX/VAG DOC CYTO: NORMAL
CYTOLOGY CVX/VAG DOC THIN PREP: NORMAL
DX ICD CODE: NORMAL
HIV 1 & 2 AB SER-IMP: NORMAL
HPV I/H RISK 4 DNA CVX QL PROBE+SIG AMP: NEGATIVE
OTHER STN SPEC: NORMAL
STAT OF ADQ CVX/VAG CYTO-IMP: NORMAL

## 2020-04-05 ENCOUNTER — APPOINTMENT (OUTPATIENT)
Dept: CT IMAGING | Facility: HOSPITAL | Age: 60
End: 2020-04-05

## 2020-04-05 ENCOUNTER — APPOINTMENT (OUTPATIENT)
Dept: GENERAL RADIOLOGY | Facility: HOSPITAL | Age: 60
End: 2020-04-05

## 2020-04-05 ENCOUNTER — HOSPITAL ENCOUNTER (EMERGENCY)
Facility: HOSPITAL | Age: 60
Discharge: HOME | End: 2020-04-05
Attending: EMERGENCY MEDICINE | Admitting: EMERGENCY MEDICINE

## 2020-04-05 VITALS
HEART RATE: 72 BPM | DIASTOLIC BLOOD PRESSURE: 51 MMHG | OXYGEN SATURATION: 95 % | RESPIRATION RATE: 15 BRPM | HEIGHT: 64 IN | WEIGHT: 264 LBS | BODY MASS INDEX: 45.07 KG/M2 | SYSTOLIC BLOOD PRESSURE: 115 MMHG | TEMPERATURE: 97.1 F

## 2020-04-05 DIAGNOSIS — R11.0 NAUSEA: ICD-10-CM

## 2020-04-05 DIAGNOSIS — R55 SYNCOPE, UNSPECIFIED SYNCOPE TYPE: Primary | ICD-10-CM

## 2020-04-05 DIAGNOSIS — R79.89 POSITIVE D DIMER: ICD-10-CM

## 2020-04-05 LAB
ALBUMIN SERPL-MCNC: 4.6 G/DL (ref 3.5–5.2)
ALBUMIN/GLOB SERPL: 1.8 G/DL
ALP SERPL-CCNC: 69 U/L (ref 39–117)
ALT SERPL W P-5'-P-CCNC: 16 U/L (ref 1–33)
ANION GAP SERPL CALCULATED.3IONS-SCNC: 14.8 MMOL/L (ref 5–15)
AST SERPL-CCNC: 17 U/L (ref 1–32)
BILIRUB SERPL-MCNC: 0.4 MG/DL (ref 0.2–1.2)
BUN BLD-MCNC: 25 MG/DL (ref 8–23)
BUN/CREAT SERPL: 18.5 (ref 7–25)
CALCIUM SPEC-SCNC: 9.9 MG/DL (ref 8.6–10.5)
CHLORIDE SERPL-SCNC: 104 MMOL/L (ref 98–107)
CO2 SERPL-SCNC: 24.2 MMOL/L (ref 22–29)
CREAT BLD-MCNC: 1.35 MG/DL (ref 0.57–1)
D DIMER PPP FEU-MCNC: 2.06 MCGFEU/ML (ref 0–0.49)
DEPRECATED RDW RBC AUTO: 43.1 FL (ref 37–54)
EOSINOPHIL # BLD MANUAL: 0.11 10*3/MM3 (ref 0–0.4)
EOSINOPHIL NFR BLD MANUAL: 1 % (ref 0.3–6.2)
ERYTHROCYTE [DISTWIDTH] IN BLOOD BY AUTOMATED COUNT: 13.5 % (ref 12.3–15.4)
GFR SERPL CREATININE-BSD FRML MDRD: 40 ML/MIN/1.73
GLOBULIN UR ELPH-MCNC: 2.6 GM/DL
GLUCOSE BLD-MCNC: 156 MG/DL (ref 65–99)
HCT VFR BLD AUTO: 38.5 % (ref 34–46.6)
HGB BLD-MCNC: 12.7 G/DL (ref 12–15.9)
LYMPHOCYTES # BLD MANUAL: 1.23 10*3/MM3 (ref 0.7–3.1)
LYMPHOCYTES NFR BLD MANUAL: 11 % (ref 19.6–45.3)
LYMPHOCYTES NFR BLD MANUAL: 7 % (ref 5–12)
MCH RBC QN AUTO: 28.4 PG (ref 26.6–33)
MCHC RBC AUTO-ENTMCNC: 33 G/DL (ref 31.5–35.7)
MCV RBC AUTO: 86.1 FL (ref 79–97)
MONOCYTES # BLD AUTO: 0.78 10*3/MM3 (ref 0.1–0.9)
NEUTROPHILS # BLD AUTO: 9.03 10*3/MM3 (ref 1.7–7)
NEUTROPHILS NFR BLD MANUAL: 81 % (ref 42.7–76)
PLAT MORPH BLD: NORMAL
PLATELET # BLD AUTO: 273 10*3/MM3 (ref 140–450)
PMV BLD AUTO: 11.1 FL (ref 6–12)
POTASSIUM BLD-SCNC: 4.1 MMOL/L (ref 3.5–5.2)
PROT SERPL-MCNC: 7.2 G/DL (ref 6–8.5)
RBC # BLD AUTO: 4.47 10*6/MM3 (ref 3.77–5.28)
RBC MORPH BLD: NORMAL
SODIUM BLD-SCNC: 143 MMOL/L (ref 136–145)
TROPONIN T SERPL-MCNC: <0.01 NG/ML (ref 0–0.03)
WBC MORPH BLD: NORMAL
WBC NRBC COR # BLD: 11.15 10*3/MM3 (ref 3.4–10.8)

## 2020-04-05 PROCEDURE — 80053 COMPREHEN METABOLIC PANEL: CPT | Performed by: PHYSICIAN ASSISTANT

## 2020-04-05 PROCEDURE — 93005 ELECTROCARDIOGRAM TRACING: CPT | Performed by: PHYSICIAN ASSISTANT

## 2020-04-05 PROCEDURE — 0 IOPAMIDOL PER 1 ML: Performed by: EMERGENCY MEDICINE

## 2020-04-05 PROCEDURE — 84484 ASSAY OF TROPONIN QUANT: CPT | Performed by: PHYSICIAN ASSISTANT

## 2020-04-05 PROCEDURE — 71275 CT ANGIOGRAPHY CHEST: CPT

## 2020-04-05 PROCEDURE — 99284 EMERGENCY DEPT VISIT MOD MDM: CPT

## 2020-04-05 PROCEDURE — 71045 X-RAY EXAM CHEST 1 VIEW: CPT

## 2020-04-05 PROCEDURE — 85007 BL SMEAR W/DIFF WBC COUNT: CPT | Performed by: PHYSICIAN ASSISTANT

## 2020-04-05 PROCEDURE — 85025 COMPLETE CBC W/AUTO DIFF WBC: CPT | Performed by: PHYSICIAN ASSISTANT

## 2020-04-05 PROCEDURE — 93010 ELECTROCARDIOGRAM REPORT: CPT | Performed by: INTERNAL MEDICINE

## 2020-04-05 PROCEDURE — 70450 CT HEAD/BRAIN W/O DYE: CPT

## 2020-04-05 PROCEDURE — 85379 FIBRIN DEGRADATION QUANT: CPT | Performed by: PHYSICIAN ASSISTANT

## 2020-04-05 PROCEDURE — 36415 COLL VENOUS BLD VENIPUNCTURE: CPT

## 2020-04-05 RX ADMIN — IOPAMIDOL 95 ML: 755 INJECTION, SOLUTION INTRAVENOUS at 20:13

## 2020-04-05 RX ADMIN — SODIUM CHLORIDE 1000 ML: 9 INJECTION, SOLUTION INTRAVENOUS at 20:23

## 2020-04-05 NOTE — ED TRIAGE NOTES
Pt reports she was at [employer] and had a syncopal episode. Pt was diaphoretic and slow to respond to ems upon their arrival.  Pt is alert and oriented at this time.   Mask present on arrival.

## 2020-04-05 NOTE — ED PROVIDER NOTES
"EMERGENCY DEPARTMENT ENCOUNTER    Room Number:  02/02  PCP: Janki Espinoza APRN  Historian: patient      HPI:  Chief Complaint: Syncope  Context: Faith Magaña is a 59 y.o. female who presents to the ED c/o a syncopal episode that occurred this afternoon. Pt also c/o dry cough, diaphoresis, nausea and SOA with exertion. Pt denies CP, seizure activity, vomiting, urinary incontinence, tongue injury, visual disturbances, congestion, HA and sore throat. The pt woke up this morning feeling normal where she proceeded to take a mile long walk and noticed herself mildly SOA. She then went to [place] with her daughter, where she walked around outside and became diaphoretic with a sensation as if she were going to \"pass out\". The pt was able to sit down before having a syncopal episode where she lost consciousness for several minutes, subjectively. The pt states upon regaining consciousness, her daughter was holding her up and EMS was called. Per triage note, the pt was diaphoretic and slow to respond upon EMS arrival. She states she is now nauseated. The pt admits to being more active as of lately however has had decreased food and fluid intake. Pt denies known hx of DVT and PE. Hx of Lupus    Patient does not present with complaints for COVID19. However, my scribe and I were both wearing masks throughout any patient interaction.       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 (CMS/[facility]) 06/27/2016   • Endometrial cancer (CMS/[facility]) 07/20/2016   • " Essential hypertension 03/11/2015   • Acquired hypothyroidism 03/11/2015   • Sleep apnea 03/11/2015     Resolved Ambulatory Problems     Diagnosis Date Noted   • Postmenopausal bleeding 06/27/2016     Past Medical History:   Diagnosis Date   • Anxiety    • Arthritis    • Asthma    • Cancer (CMS/HCC)    • Depression    • Hypertension    • LGSIL (low grade squamous intraepithelial dysplasia) 08/31/2004   • Lupus (CMS/HCC) 09/2015   • Menopause    • Morbid obesity (CMS/HCC)    • Pneumonia    • Prothrombin [id number] mutation (CMS/HCC) 06/22/2015   • Trichomonas infection 05/12/2014       PAST SURGICAL HISTORY  Past Surgical History:   Procedure Laterality Date   • HYSTERECTOMY  08/19/2016    ANAHY LUJAN    • TUBAL ABDOMINAL LIGATION  1988    Sangeeta Tony       FAMILY HISTORY  Family History   Problem Relation Age of Onset   • Stroke Mother    • Heart disease Mother    • Hypertension Mother    • Diabetes Mother    • Heart disease Father    • Kidney disease Other    • Mental illness Other    • Stroke Sister    • Mental illness Sister    • Ovarian cancer Sister 28   • Hypertension Maternal Aunt    • Diabetes Maternal Aunt    • Breast cancer Maternal Grandmother 62   • Bleeding Disorder Daughter        SOCIAL HISTORY  Social History     Socioeconomic History   • Marital status:      Spouse name: Not on file   • Number of children: 3   • Years of education: 12   • Highest education level: Not on file   Tobacco Use   • Smoking status: Never Smoker   • Smokeless tobacco: Never Used   Substance and Sexual Activity   • Alcohol use: No   • Drug use: No   • Sexual activity: Not Currently     Partners: Male     Birth control/protection: Post-menopausal   Social History Narrative    Ob/gyn patient since 2004.           REVIEW OF SYSTEMS  Review of Systems   Constitutional: Positive for diaphoresis. Negative for chills and fever.   HENT: Negative for sore throat.    Respiratory: Positive for cough and shortness of breath.     Cardiovascular: Negative for chest pain.   Gastrointestinal: Positive for nausea. Negative for abdominal pain, diarrhea and vomiting.   Genitourinary: Negative for dysuria.   Musculoskeletal: Negative for back pain and neck pain.   Skin: Negative for rash.   Neurological: Positive for syncope. Negative for dizziness, seizures, weakness, numbness and headaches.   Psychiatric/Behavioral: The patient is not nervous/anxious.    All other systems reviewed and are negative.      PHYSICAL EXAM  ED Triage Vitals   Temp Heart Rate Resp BP SpO2   04/05/20 1826 04/05/20 1826 -- 04/05/20 1828 04/05/20 1828   97.1 °F (36.2 °C) 58  (!) 150/110 95 %      Temp src Heart Rate Source Patient Position BP Location FiO2 (%)     Physical Exam   Constitutional: She is oriented to person, place, and time. No distress.   HENT:   Head: Normocephalic and atraumatic.   No obvious tongue injury   Eyes: Pupils are equal, round, and reactive to light. Right eye exhibits nystagmus (horizontal). Left eye exhibits nystagmus (horizontal).   Neck: Normal range of motion. Neck supple.   Cardiovascular: Normal rate, regular rhythm and normal heart sounds.   Pulmonary/Chest: Effort normal and breath sounds normal. No respiratory distress.   Abdominal: Soft. There is no tenderness. There is no rebound and no guarding.   Obese   Musculoskeletal: Normal range of motion. She exhibits no edema.   Neurological: She is alert and oriented to person, place, and time. She has normal sensation and normal strength.   Skin: Skin is warm and dry. No rash noted.   Psychiatric: Mood and affect normal.   Nursing note and vitals reviewed.          LAB RESULTS  Recent Results (from the past 24 hour(s))   Comprehensive Metabolic Panel    Collection Time: 04/05/20  6:55 PM   Result Value Ref Range    Glucose 156 (H) 65 - 99 mg/dL    BUN 25 (H) 8 - 23 mg/dL    Creatinine 1.35 (H) 0.57 - 1.00 mg/dL    Sodium 143 136 - 145 mmol/L    Potassium 4.1 3.5 - 5.2 mmol/L    Chloride  104 98 - 107 mmol/L    CO2 24.2 22.0 - 29.0 mmol/L    Calcium 9.9 8.6 - 10.5 mg/dL    Total Protein 7.2 6.0 - 8.5 g/dL    Albumin 4.60 3.50 - 5.20 g/dL    ALT (SGPT) 16 1 - 33 U/L    AST (SGOT) 17 1 - 32 U/L    Alkaline Phosphatase 69 39 - 117 U/L    Total Bilirubin 0.4 0.2 - 1.2 mg/dL    eGFR Non African Amer 40 (L) >60 mL/min/1.73    Globulin 2.6 gm/dL    A/G Ratio 1.8 g/dL    BUN/Creatinine Ratio 18.5 7.0 - 25.0    Anion Gap 14.8 5.0 - 15.0 mmol/L   Troponin    Collection Time: 04/05/20  6:55 PM   Result Value Ref Range    Troponin T <0.010 0.000 - 0.030 ng/mL   CBC Auto Differential    Collection Time: 04/05/20  6:55 PM   Result Value Ref Range    WBC 11.15 (H) 3.40 - 10.80 10*3/mm3    RBC 4.47 3.77 - 5.28 10*6/mm3    Hemoglobin 12.7 12.0 - 15.9 g/dL    Hematocrit 38.5 34.0 - 46.6 %    MCV 86.1 79.0 - 97.0 fL    MCH 28.4 26.6 - 33.0 pg    MCHC 33.0 31.5 - 35.7 g/dL    RDW 13.5 12.3 - 15.4 %    RDW-SD 43.1 37.0 - 54.0 fl    MPV 11.1 6.0 - 12.0 fL    Platelets 273 140 - 450 10*3/mm3   D-dimer, Quantitative    Collection Time: 04/05/20  6:55 PM   Result Value Ref Range    D-Dimer, Quantitative 2.06 (H) 0.00 - 0.49 MCGFEU/mL   Manual Differential    Collection Time: 04/05/20  6:55 PM   Result Value Ref Range    Neutrophil % 81.0 (H) 42.7 - 76.0 %    Lymphocyte % 11.0 (L) 19.6 - 45.3 %    Monocyte % 7.0 5.0 - 12.0 %    Eosinophil % 1.0 0.3 - 6.2 %    Neutrophils Absolute 9.03 (H) 1.70 - 7.00 10*3/mm3    Lymphocytes Absolute 1.23 0.70 - 3.10 10*3/mm3    Monocytes Absolute 0.78 0.10 - 0.90 10*3/mm3    Eosinophils Absolute 0.11 0.00 - 0.40 10*3/mm3    RBC Morphology Normal Normal    WBC Morphology Normal Normal    Platelet Morphology Normal Normal       I ordered the above labs and reviewed the results      RADIOLOGY  CT Angiogram Chest   Preliminary Result   1. No active disease or pulmonary embolism.                  CT Head Without Contrast   Preliminary Result       No evidence for acute intracranial pathology.  However, if there   continues to be clinical suspicion for a CT occult infarct, further   evaluation could be performed with MR imaging as clinically indicated.       These findings and recommendations were discussed with Vero Cheung PA-C, of the emergency room staff on 04/05/2020 at approximately 7:12   PM.       Radiation dose reduction techniques were utilized, including automated   exposure control and exposure modulation based on body size.              XR Chest 1 View   Preliminary Result       No active disease by portable imaging.                  I ordered the above noted radiological studies and reviewed the images on the PACS system.     MEDICATIONS GIVEN IN ER  Medications   sodium chloride 0.9 % bolus 1,000 mL (1,000 mL Intravenous New Bag 4/5/20 2023)   iopamidol (ISOVUE-370) 76 % injection 100 mL (95 mL Intravenous Given by Other 4/5/20 2013)       EKG  Interpreted by Dr. José (ER physician). Please see Estefanía interpretation.       PROCEDURES  Procedures      PROGRESS AND CONSULTS    Progress Notes:       1846 CXR ordered. CMP ordered. Troponin ordered. ECG ordered.     1847 D-dimer ordered.      1855 Discussed w/ pt's daughter the syncopal episode the patient had today. Per patient's daughter, the pt had two episodes of unresponsiveness, lasting approximately 15 seconds. During the first episode, she noticed RUE tremors. Per pt's daughter, the pt has been complaining of dizziness for the past couple weeks.    1858 CT Head ordered.     1951 CTA Chest ordered.     2030 Reviewed pt's history and workup with Dr. José (ER physician). After a bedside evaluation, Dr. José agrees with the plan of care.    9:01P  I rechecked patient and informed her of imaging and lab results which were unremarkable.  I believe patient symptoms were due to vasovagal syncope.  Patient walked about 1.5 miles earlier today and reports she did not drink much or eat a lot prior to her walk.      Disposition vitals:  /52  "(BP Location: Left arm, Patient Position: Lying)   Pulse 55   Temp 97.1 °F (36.2 °C)   Resp 15   Ht 162.6 cm (64\")   Wt 120 kg (264 lb)   LMP  (LMP Unknown)   SpO2 96%   BMI 45.32 kg/m²           DIAGNOSIS  Final diagnoses:   Positive D dimer   Syncope, unspecified syncope type   Nausea         DISPOSITION  DISCHARGE    Patient discharged in stable condition.    Reviewed implications of results, diagnosis, meds, responsibility to follow up, warning signs and symptoms of possible worsening, potential complications and reasons to return to ER.    Patient/Family voiced understanding of above instructions.    Discussed plan for discharge, as there is no emergent indication for admission. Patient referred to primary care provider for BP management due to today's BP. Pt/family is agreeable and understands need for follow up and repeat testing.  Pt is aware that discharge does not mean that nothing is wrong but it indicates no emergency is present that requires admission and they must continue care with follow-up as given below or physician of their choice.     FOLLOW-UP  Janki Espinoza, APRN  [address]  [facility] [address] [phone]    Call   For follow-up    [facility]  [address] [facility] [address] [phone]    As needed, If symptoms worsen         Medication List      No changes were made to your prescriptions during this visit.         Documentation assistance provided by rangel Mercedes for Ms. Vero Cheung PA-C.  Information recorded by the rangel was done at my direction and has been verified and validated by me.           Daren Sanford  04/05/20 2049       Vero Cheung PA-C  04/05/20 2103    "

## 2020-04-05 NOTE — ED NOTES
Pt came in via EMS after a syncope episode; denies any chest pain, SOA, or pain.  She does say she has a little cough that she's had for a few days, nonproductive, & no fever. I wore mask & gloves during assessment.     Tiesha Davison, RN  04/05/20 3086

## 2020-04-06 NOTE — DISCHARGE INSTRUCTIONS
Please drink about 8 glasses of water a day.  Light activity the next few days.  When you are moving positions, move slowly.  Return to the ER with any concerning or worsening symptoms.

## 2020-04-06 NOTE — ED PROVIDER NOTES
Pt presents to the ED c/o  a syncopal episode that occurred this afternoon, patient states that she went on a 1.5 mile hike which was uphill and much longer than she typically does, in conjunction with not having eaten or drink much throughout the day today.  She states that she went to [employer] to look at flowers afterwards, felt herself getting hot and that is when she passed out.  Denied any associated chest pains.  Does have a slight dry cough, was diaphoretic and nauseous, and had some dyspnea with exertion.  Denies any recent other illnesses.  Currently reports feeling well with no complaints.     On exam,   General: Awake, alert, no acute distress, nondiaphoretic  HEENT: Mucous membranes moist, atraumatic, normocephalic, EOMI  Neck: Full ROM  Pulm: Symmetric, non-labored, lungs CTAB  Cardiovascular: Regular rate and rhythm, normal S1/S2, intact distal pulses  GI: Soft, non-tender, non-distended, no rebound, no guarding, bowel sounds present  MSK: Full ROM, no deformity  Skin: Warm, dry  Neuro: Alert and oriented x 3, GCS 15, moving all extremities, no focal deficits  Psych: Calm, cooperative      Surgical mask with face shield, surgical cap, and gloves used during this encounter.    EKG    EKG Time: 1856  Rhythm/Rate: Sinus rhythm with a rate of 53  Nml axis  Nml intervals   No acute ischemic changes  No STEMI     Interpreted Contemporaneously by me, independently viewed  No emergent changes as compared to May 25, 2018        Plan:  Laboratory work-up today is nonemergent other than elevated d-dimer, CT angiogram of the chest was obtained which shows no evidence of PE or pneumonia, no pneumothorax or pulmonary edema or pleural effusions at this time.  I suspect vasovagal syncope from the increased exertion from typical, and lack of good p.o. intake today.  I have advised her to eat regular healthy meals, drink plenty of water, continue current medications, follow-up with PCP as needed, and return to the  emergency department for worsening symptoms as needed.       Attestation:  The YUNG and I have discussed this patient's history, physical exam, and treatment plan.  I have reviewed the documentation and personally had a face to face interaction with the patient. I affirm the documentation and agree with the treatment and plan.  The attached note describes my personal findings.            Zac José MD  04/05/20 9658

## 2021-02-05 ENCOUNTER — OFFICE VISIT (OUTPATIENT)
Dept: OBSTETRICS AND GYNECOLOGY | Age: 61
End: 2021-02-05

## 2021-02-05 VITALS
WEIGHT: 249 LBS | BODY MASS INDEX: 42.51 KG/M2 | SYSTOLIC BLOOD PRESSURE: 122 MMHG | HEIGHT: 64 IN | DIASTOLIC BLOOD PRESSURE: 70 MMHG

## 2021-02-05 DIAGNOSIS — D68.52 HETEROZYGOUS FOR PROTHROMBIN G20210A MUTATION (HCC): ICD-10-CM

## 2021-02-05 DIAGNOSIS — Z11.51 SCREENING FOR HUMAN PAPILLOMAVIRUS (HPV): ICD-10-CM

## 2021-02-05 DIAGNOSIS — Z12.4 SCREENING FOR MALIGNANT NEOPLASM OF CERVIX: ICD-10-CM

## 2021-02-05 DIAGNOSIS — C54.1 ENDOMETRIAL CANCER (HCC): ICD-10-CM

## 2021-02-05 DIAGNOSIS — Z13.89 SCREENING FOR BLOOD OR PROTEIN IN URINE: ICD-10-CM

## 2021-02-05 DIAGNOSIS — Z12.31 SCREENING MAMMOGRAM, ENCOUNTER FOR: ICD-10-CM

## 2021-02-05 DIAGNOSIS — Z01.419 WELL FEMALE EXAM WITH ROUTINE GYNECOLOGICAL EXAM: Primary | ICD-10-CM

## 2021-02-05 LAB
BILIRUB BLD-MCNC: NEGATIVE MG/DL
CLARITY, POC: CLEAR
COLOR UR: YELLOW
GLUCOSE UR STRIP-MCNC: NEGATIVE MG/DL
KETONES UR QL: NEGATIVE
LEUKOCYTE EST, POC: NEGATIVE
NITRITE UR-MCNC: NEGATIVE MG/ML
PH UR: 5.5 [PH] (ref 5–8)
PROT UR STRIP-MCNC: NEGATIVE MG/DL
RBC # UR STRIP: NEGATIVE /UL
SP GR UR: 1.03 (ref 1–1.03)
UROBILINOGEN UR QL: NORMAL

## 2021-02-05 PROCEDURE — 99396 PREV VISIT EST AGE 40-64: CPT | Performed by: OBSTETRICS & GYNECOLOGY

## 2021-02-05 PROCEDURE — 81002 URINALYSIS NONAUTO W/O SCOPE: CPT | Performed by: OBSTETRICS & GYNECOLOGY

## 2021-02-05 NOTE — PROGRESS NOTES
Routine Annual Visit    2021    Patient: Faith Magaña          MR#:[id number]    History of Present Illness    Chief Complaint   Patient presents with   • Gynecologic Exam     last pap 2019 neg, last mg 2019 neg       60 y.o. female  who presents for annual exam.    The patient presents for routine annual exam feeling well without complaints.  She had knee replacement in the last year and is lost approximately 65 pounds by dietary modification.  The patient has a history of stage Ia endometrial cancer in 2016 and remains free of disease    Studies reviewed:    No LMP recorded (lmp unknown). Patient is postmenopausal.  Obstetric History:  OB History        4    Para   3    Term   3            AB   1    Living   3       SAB   1    TAB        Ectopic        Molar        Multiple        Live Births   3               Menstrual History:     No LMP recorded (lmp unknown). Patient is postmenopausal.       Sexual History:       ________________________________________  Patient Active Problem List   Diagnosis   • Well female exam with routine gynecological exam   • Heterozygous for prothrombin [id number] mutation (CMS/HCC)   • Endometrial cancer (CMS/HCC)   • Essential hypertension   • Acquired hypothyroidism   • Sleep apnea     Past Medical History:   Diagnosis Date   • Anxiety    • Arthritis    • Asthma    • Cancer (CMS/HCC)     uterine   • Depression    • Hypertension    • LGSIL (low grade squamous intraepithelial dysplasia) 2004   • Lupus (CMS/HCC) 2015   • Menopause    • Morbid obesity (CMS/HCC)    • Pneumonia    • Prothrombin [id number] mutation (CMS/HCC) 2015    heterozygous   • Sleep apnea    • Trichomonas infection 2014     Past Surgical History:   Procedure Laterality Date   • HYSTERECTOMY  2016    ANAHY LUJAN    • TOTAL KNEE ARTHROPLASTY Left 2021   • TUBAL ABDOMINAL LIGATION      Sangeeta Tony     Social History     Tobacco Use   Smoking Status Never  Smoker   Smokeless Tobacco Never Used     Family History   Problem Relation Age of Onset   • Stroke Mother    • Heart disease Mother    • Hypertension Mother    • Diabetes Mother    • Heart disease Father    • Kidney disease Other    • Mental illness Other    • Stroke Sister    • Mental illness Sister    • Ovarian cancer Sister 28   • Hypertension Maternal Aunt    • Diabetes Maternal Aunt    • Breast cancer Maternal Grandmother 62   • Bleeding Disorder Daughter      Prior to Admission medications    Medication Sig Start Date End Date Taking? Authorizing Provider   aspirin 81 MG tablet Take 81 mg by mouth.   Yes Miranda Hernandez MD   atenolol (TENORMIN) 100 MG tablet Take 100 mg by mouth. 8/3/17  Yes Miranda Hernandez MD   buPROPion (WELLBUTRIN) 75 MG tablet Take 75 mg by mouth. 11/3/16  Yes Miranda Hernandez MD   levothyroxine (SYNTHROID, LEVOTHROID) 175 MCG tablet Take 175 mcg by mouth Daily. 12/6/19  Yes Miranda Hernandez MD   losartan-hydrochlorothiazide (HYZAAR) 100-12.5 MG per tablet Take 1 tablet by mouth. 7/8/15  Yes Miranda Hernandez MD   vitamin D (ERGOCALCIFEROL) 72841 UNITS capsule capsule TAKE ONE CAPSULE BY MOUTH ONCE WEEKLY 7/11/16  Yes Miranda Hernandez MD   Vortioxetine HBr 20 MG tablet Take 20 mg by mouth. 2/8/18  Yes Miranda Hernandez MD   Multiple Vitamins-Calcium (ONE-A-DAY WOMENS FORMULA PO) Take 1 tablet by mouth daily.    Miranda Hernandez MD     ________________________________________    Current contraception: status post hysterectomy  History of abnormal Pap smear: no  Family history of uterine or ovarian cancer: no  Family History of colon cancer/colon polyps: no  History of abnormal mammogram: no  History of abnormal lipids: yes - monitored    The following portions of the patient's history were reviewed and updated as appropriate: allergies, current medications, past family history, past medical history, past social history, past surgical history and  "problem list.    Review of Systems    Pertinent items are noted in HPI.       Objective   Physical Exam    /70   Ht 162.6 cm (64\")   Wt 113 kg (249 lb)   LMP  (LMP Unknown)   Breastfeeding No   BMI 42.74 kg/m²    BP Readings from Last 3 Encounters:   02/05/21 122/70   04/05/20 115/51   12/20/19 124/70      Wt Readings from Last 3 Encounters:   02/05/21 113 kg (249 lb)   04/05/20 120 kg (264 lb)   12/20/19 122 kg (269 lb)        BMI: Estimated body mass index is 42.74 kg/m² as calculated from the following:    Height as of this encounter: 162.6 cm (64\").    Weight as of this encounter: 113 kg (249 lb).       General: alert, appears stated age and cooperative   Heart: regular rate and rhythm, S1, S2 normal, no murmur, click, rub or gallop   Lungs: clear to auscultation bilaterally   Abdomen: soft, non-tender, without masses, no organomegaly   Breast: inspection negative, no nipple discharge or bleeding, no masses or nodularity palpable   External genitalia/Vulva: mild atrophy, External genitalia including bartholin's glands, Urethra, [place]'s gland and urethra meatus are normal, Perineum, rectum and anus appear normal  and Bladder appears normal without significant prolapse    Vagina: normal mucosa, normal discharge, atrophic appearance    Cervix: absent   Uterus: absent    Adnexa: exam limited by habitus     As part of wellness and prevention, the following topics were discussed with the patient:  Encouraged self breast exam  Physical activity and regular exercised encouraged.     Assessment:    Diagnoses and all orders for this visit:    1. Well female exam with routine gynecological exam (Primary)  -     IgP, Aptima HPV    2. Screening for blood or protein in urine  -     POC Urinalysis Dipstick    3. Screening mammogram, encounter for  -     Mammo Screening Digital Tomosynthesis Bilateral With CAD; Future    4. Screening for human papillomavirus (HPV)  -     IgP, Aptima HPV    5. Screening for malignant " neoplasm of cervix  -     IgP, Aptima HPV    6. Heterozygous for prothrombin k62465a mutation (CMS/HCC)    7. Endometrial cancer (CMS/HCC)        Plan:  Return in about 1 year (around 2/5/2022) for Annual GYN exam.      Jr Peace MD  2/5/2021 08:43 EST

## 2021-03-16 ENCOUNTER — BULK ORDERING (OUTPATIENT)
Dept: CASE MANAGEMENT | Facility: OTHER | Age: 61
End: 2021-03-16

## 2021-03-16 DIAGNOSIS — Z23 IMMUNIZATION DUE: ICD-10-CM

## 2022-06-08 ENCOUNTER — TELEPHONE (OUTPATIENT)
Dept: OBSTETRICS AND GYNECOLOGY | Age: 62
End: 2022-06-08

## 2022-06-08 NOTE — TELEPHONE ENCOUNTER
Provider: DR. FELICIANO BOBO    Caller: PITA MIXON    Relationship to Patient: SELF    Phone Number: [phone]    Reason for Call: PT HAD UTERINE CANCER IN AUGUST OF 2016 WITH A COMPLETE HYSTERECTOMY. SHE WAS UNDER THE IMPRESSION THAT AFTER 5 YEARS SHE DID NOT HAVE TO COME BACK FOR ANNUAL CHECK UPS. IS THIS CORRECT OR DOES SHE NEED ANOTHER ANNUAL VISIT? PLEASE CALL HER AND LET HER KNOW. PT CAN BE REACHED AT ANY TIME AND YOU CAN LEAVE A VM IF SHE DOESN'T ANSWER.